# Patient Record
Sex: FEMALE | Race: WHITE | URBAN - METROPOLITAN AREA
[De-identification: names, ages, dates, MRNs, and addresses within clinical notes are randomized per-mention and may not be internally consistent; named-entity substitution may affect disease eponyms.]

---

## 2023-05-16 ENCOUNTER — OFFICE VISIT (OUTPATIENT)
Dept: FAMILY MEDICINE CLINIC | Facility: CLINIC | Age: 38
End: 2023-05-16

## 2023-05-16 VITALS
DIASTOLIC BLOOD PRESSURE: 65 MMHG | BODY MASS INDEX: 26.62 KG/M2 | TEMPERATURE: 98 F | WEIGHT: 175.63 LBS | HEART RATE: 68 BPM | SYSTOLIC BLOOD PRESSURE: 102 MMHG | HEIGHT: 68 IN

## 2023-05-16 DIAGNOSIS — O24.410 DIET CONTROLLED GESTATIONAL DIABETES MELLITUS (GDM), ANTEPARTUM: ICD-10-CM

## 2023-05-16 DIAGNOSIS — Z00.00 ADULT GENERAL MEDICAL EXAM: Primary | ICD-10-CM

## 2023-05-16 DIAGNOSIS — R53.83 LETHARGY: ICD-10-CM

## 2023-05-16 PROCEDURE — 99385 PREV VISIT NEW AGE 18-39: CPT | Performed by: FAMILY MEDICINE

## 2023-05-16 PROCEDURE — 3008F BODY MASS INDEX DOCD: CPT | Performed by: FAMILY MEDICINE

## 2023-05-16 PROCEDURE — 3078F DIAST BP <80 MM HG: CPT | Performed by: FAMILY MEDICINE

## 2023-05-16 PROCEDURE — 3074F SYST BP LT 130 MM HG: CPT | Performed by: FAMILY MEDICINE

## 2023-05-16 RX ORDER — FERROUS SULFATE 325(65) MG
TABLET ORAL
COMMUNITY
End: 2023-05-16

## 2023-05-16 RX ORDER — IBUPROFEN 600 MG/1
600 TABLET ORAL 4 TIMES DAILY PRN
COMMUNITY
Start: 2023-04-10

## 2023-05-17 ENCOUNTER — PATIENT MESSAGE (OUTPATIENT)
Dept: FAMILY MEDICINE CLINIC | Facility: CLINIC | Age: 38
End: 2023-05-17

## 2023-05-17 DIAGNOSIS — R53.83 LETHARGY: Primary | ICD-10-CM

## 2023-05-17 DIAGNOSIS — D64.9 ANEMIA, UNSPECIFIED TYPE: ICD-10-CM

## 2023-05-17 LAB
ABSOLUTE BASOPHILS: 11 CELLS/UL (ref 0–200)
ABSOLUTE EOSINOPHILS: 68 CELLS/UL (ref 15–500)
ABSOLUTE LYMPHOCYTES: 1098 CELLS/UL (ref 850–3900)
ABSOLUTE MONOCYTES: 308 CELLS/UL (ref 200–950)
ABSOLUTE NEUTROPHILS: 2314 CELLS/UL (ref 1500–7800)
ALBUMIN/GLOBULIN RATIO: 1.8 (CALC) (ref 1–2.5)
ALBUMIN: 4.4 G/DL (ref 3.6–5.1)
ALKALINE PHOSPHATASE: 58 U/L (ref 31–125)
ALT: 12 U/L (ref 6–29)
AST: 12 U/L (ref 10–30)
BASOPHILS: 0.3 %
BILIRUBIN, TOTAL: 0.5 MG/DL (ref 0.2–1.2)
BUN: 13 MG/DL (ref 7–25)
CALCIUM: 9 MG/DL (ref 8.6–10.2)
CARBON DIOXIDE: 24 MMOL/L (ref 20–32)
CHLORIDE: 105 MMOL/L (ref 98–110)
CHOL/HDLC RATIO: 1.9 (CALC)
CHOLESTEROL, TOTAL: 115 MG/DL
CREATININE: 0.64 MG/DL (ref 0.5–0.97)
EGFR: 117 ML/MIN/1.73M2
EOSINOPHILS: 1.8 %
GLOBULIN: 2.4 G/DL (CALC) (ref 1.9–3.7)
GLUCOSE: 87 MG/DL (ref 65–99)
HDL CHOLESTEROL: 62 MG/DL
HEMATOCRIT: 32.3 % (ref 35–45)
HEMOGLOBIN: 9.7 G/DL (ref 11.7–15.5)
LDL-CHOLESTEROL: 39 MG/DL (CALC)
LYMPHOCYTES: 28.9 %
MCH: 22.2 PG (ref 27–33)
MCHC: 30 G/DL (ref 32–36)
MCV: 73.9 FL (ref 80–100)
MONOCYTES: 8.1 %
MPV: 10.7 FL (ref 7.5–12.5)
NEUTROPHILS: 60.9 %
NON-HDL CHOLESTEROL: 53 MG/DL (CALC)
PLATELET COUNT: 161 THOUSAND/UL (ref 140–400)
POTASSIUM: 4.3 MMOL/L (ref 3.5–5.3)
PROTEIN, TOTAL: 6.8 G/DL (ref 6.1–8.1)
RDW: 15.4 % (ref 11–15)
RED BLOOD CELL COUNT: 4.37 MILLION/UL (ref 3.8–5.1)
SODIUM: 138 MMOL/L (ref 135–146)
TRIGLYCERIDES: 49 MG/DL
TSH W/REFLEX TO FT4: 1.55 MIU/L
VITAMIN D, 25-OH, TOTAL: 33 NG/ML (ref 30–100)
WHITE BLOOD CELL COUNT: 3.8 THOUSAND/UL (ref 3.8–10.8)

## 2023-05-18 NOTE — TELEPHONE ENCOUNTER
From: Shira Herrera  To: Nikole Elizalde DO  Sent: 5/17/2023 8:26 PM CDT  Subject: Question regarding LIPID PANEL    As a suspected. No, I would say that my periods are pretty normal.  Any recommendations regarding my anemia?

## 2023-05-24 ENCOUNTER — PATIENT MESSAGE (OUTPATIENT)
Dept: FAMILY MEDICINE CLINIC | Facility: CLINIC | Age: 38
End: 2023-05-24

## 2023-05-24 LAB
% SATURATION: 5 % (CALC) (ref 16–45)
FERRITIN: 3 NG/ML (ref 16–154)
IRON BINDING CAPACITY: 434 MCG/DL (CALC) (ref 250–450)
IRON, TOTAL: 21 MCG/DL (ref 40–190)
RETICULOCYTE COUNT,$AUTOMATED: 1.1 %
RETICULOCYTE, ABSOLUTE: NORMAL CELLS/UL (ref 20000–80000)
VITAMIN D, 25-OH, TOTAL: 31 NG/ML (ref 30–100)

## 2023-05-24 RX ORDER — FERROUS SULFATE 325(65) MG
325 TABLET ORAL
Qty: 90 TABLET | Refills: 1 | Status: SHIPPED | OUTPATIENT
Start: 2023-05-24 | End: 2023-11-20

## 2023-05-25 NOTE — TELEPHONE ENCOUNTER
From: Dianne Campa  To: Wilma Lesch, DO  Sent: 5/24/2023 2:10 PM CDT  Subject: Question regarding FERRITIN    Hi  Will I have to get prescription iron or something I should get over the counter?   Also to let you know my appointment with GI doctor is on July 11th

## 2023-07-11 ENCOUNTER — TELEPHONE (OUTPATIENT)
Facility: CLINIC | Age: 38
End: 2023-07-11

## 2023-07-11 ENCOUNTER — LAB ENCOUNTER (OUTPATIENT)
Dept: LAB | Facility: HOSPITAL | Age: 38
End: 2023-07-11
Attending: PHYSICIAN ASSISTANT
Payer: COMMERCIAL

## 2023-07-11 ENCOUNTER — OFFICE VISIT (OUTPATIENT)
Facility: CLINIC | Age: 38
End: 2023-07-11

## 2023-07-11 VITALS
HEART RATE: 71 BPM | SYSTOLIC BLOOD PRESSURE: 101 MMHG | DIASTOLIC BLOOD PRESSURE: 60 MMHG | WEIGHT: 180 LBS | BODY MASS INDEX: 27.28 KG/M2 | HEIGHT: 68 IN

## 2023-07-11 DIAGNOSIS — D50.9 IRON DEFICIENCY ANEMIA, UNSPECIFIED IRON DEFICIENCY ANEMIA TYPE: Primary | ICD-10-CM

## 2023-07-11 LAB
BASOPHILS # BLD AUTO: 0.01 X10(3) UL (ref 0–0.2)
BASOPHILS NFR BLD AUTO: 0.2 %
DEPRECATED HBV CORE AB SER IA-ACNC: 15.1 NG/ML
DEPRECATED RDW RBC AUTO: 56.8 FL (ref 35.1–46.3)
EOSINOPHIL # BLD AUTO: 0.08 X10(3) UL (ref 0–0.7)
EOSINOPHIL NFR BLD AUTO: 1.7 %
ERYTHROCYTE [DISTWIDTH] IN BLOOD BY AUTOMATED COUNT: 20 % (ref 11–15)
HCT VFR BLD AUTO: 35.7 %
HGB BLD-MCNC: 11.1 G/DL
IMM GRANULOCYTES # BLD AUTO: 0.01 X10(3) UL (ref 0–1)
IMM GRANULOCYTES NFR BLD: 0.2 %
LYMPHOCYTES # BLD AUTO: 1.25 X10(3) UL (ref 1–4)
LYMPHOCYTES NFR BLD AUTO: 26 %
MCH RBC QN AUTO: 24.4 PG (ref 26–34)
MCHC RBC AUTO-ENTMCNC: 31.1 G/DL (ref 31–37)
MCV RBC AUTO: 78.6 FL
MONOCYTES # BLD AUTO: 0.32 X10(3) UL (ref 0.1–1)
MONOCYTES NFR BLD AUTO: 6.7 %
NEUTROPHILS # BLD AUTO: 3.13 X10 (3) UL (ref 1.5–7.7)
NEUTROPHILS # BLD AUTO: 3.13 X10(3) UL (ref 1.5–7.7)
NEUTROPHILS NFR BLD AUTO: 65.2 %
PLATELET # BLD AUTO: 174 10(3)UL (ref 150–450)
RBC # BLD AUTO: 4.54 X10(6)UL
WBC # BLD AUTO: 4.8 X10(3) UL (ref 4–11)

## 2023-07-11 PROCEDURE — 3074F SYST BP LT 130 MM HG: CPT | Performed by: PHYSICIAN ASSISTANT

## 2023-07-11 PROCEDURE — 3008F BODY MASS INDEX DOCD: CPT | Performed by: PHYSICIAN ASSISTANT

## 2023-07-11 PROCEDURE — 99204 OFFICE O/P NEW MOD 45 MIN: CPT | Performed by: PHYSICIAN ASSISTANT

## 2023-07-11 PROCEDURE — 82728 ASSAY OF FERRITIN: CPT | Performed by: PHYSICIAN ASSISTANT

## 2023-07-11 PROCEDURE — 85025 COMPLETE CBC W/AUTO DIFF WBC: CPT | Performed by: PHYSICIAN ASSISTANT

## 2023-07-11 PROCEDURE — 36415 COLL VENOUS BLD VENIPUNCTURE: CPT | Performed by: PHYSICIAN ASSISTANT

## 2023-07-11 PROCEDURE — 3078F DIAST BP <80 MM HG: CPT | Performed by: PHYSICIAN ASSISTANT

## 2023-07-11 RX ORDER — SODIUM, POTASSIUM,MAG SULFATES 17.5-3.13G
SOLUTION, RECONSTITUTED, ORAL ORAL
Qty: 1 EACH | Refills: 0 | Status: SHIPPED | OUTPATIENT
Start: 2023-07-11

## 2023-07-11 NOTE — H&P
[facility] - Gastroenterology                                                                                                               Reason for consult: Patient presents with:  Consult: For anemia      Requesting physician or provider: Hien Menard MD      HPI:   Emigdio Way is a 40year old year-old female with history of anemia who presents for evaluation of anemia. She was seen for her annual physical. She notes she complained of mild fatigue. She got CBC done that showed a hemoglobin of 9.7 and a ferritin of 3. She denies dizziness, light headedness. Denies brbpr, melena. she denies acid reflux and/or heartburn. she denies dysphagia, odynophagia and/or globus. she denies abdominal pain. she denies nausea and/or vomiting. she denies recent change in appetite and/or unintentional weight loss. she denies bloating. Menses: monthly, heavy for first 2 days (use of super tampon every 2 hours), then light     NSAIDS/ASA: no  Tobacco: no  Alcohol: social   Marijuana: none  Illicit drugs: none    FH GI malignancyNo    No history of adverse reaction to sedation  No BELEN  No anticoagulants  No pacemaker/defibrillator  No pain medications and/or sleep aides      Last colonoscopy: none  Last EGD: none    Wt Readings from Last 6 Encounters:  23 : 180 lb (81.6 kg)  23 : 175 lb 9.6 oz (79.7 kg)       History, Medications, Allergies, ROS:      History reviewed. No pertinent past medical history.    Past Surgical History:   Procedure Laterality Date            Family Hx:   Family History   Problem Relation Age of Onset    Cancer Maternal Grandfather         lung      Social History:   Social History     Socioeconomic History    Marital status:    Tobacco Use    Smoking status: Never    Smokeless tobacco: Never   Substance and Sexual Activity    Alcohol use: Yes     Comment: socially        Medications (Active prior to today's visit):  Current Outpatient Medications   Medication Sig Dispense Refill    Na Sulfate-K Sulfate-Mg Sulf (SUPREP BOWEL PREP KIT) 17.5-3.13-1.6 GM/177ML Oral Solution Take as directed 1 each 0    Ferrous Sulfate 325 (65 Fe) MG Oral Tab Take 1 tablet (325 mg total) by mouth daily with breakfast. 90 tablet 1    ibuprofen 600 MG Oral Tab Take 1 tablet (600 mg total) by mouth 4 (four) times daily as needed. (Patient not taking: Reported on 7/11/2023)         Allergies:  No Known Allergies    ROS:   CONSTITUTIONAL: negative for fevers, chills, sweats and weight loss  EYES Negative for red eyes, yellow eyes, changes in vision  HEENT: Negative for dysphagia and hoarseness  RESPIRATORY: Negative for cough and shortness of breath  CARDIOVASCULAR: Negative for chest pain, palpitations  GASTROINTESTINAL: See HPI  GENITOURINARY: Negative for dysuria and frequency  MUSCULOSKELETAL: Negative for arthralgias and myalgias  NEUROLOGICAL: Negative for dizziness and headaches  BEHAVIOR/PSYCH: Negative for anxiety and poor appetite    PHYSICAL EXAM:   Blood pressure 101/60, pulse 71, height 5' 8\" (1.727 m), weight 180 lb (81.6 kg), last menstrual period 05/01/2023. GEN: WD/WN, NAD  HEENT: Supple symmetrical, trachea midline  CV: RRR, the extremities are warm and well perfused   LUNGS: No increased work of breathing  ABDOMEN: No scars, normal bowel sounds, soft, non-tender, non-distended no rebound or guarding, no masses, no hepatomegaly  MSK: No redness, no warmth, no swelling of joints  SKIN: No jaundice, no erythema, no rashes  HEMATOLOGIC: No bleeding, no bruising  NEURO: Alert and interactive, normal gait    Labs/Imaging/Procedures:     Patient's pertinent labs and imaging were reviewed and discussed with patient today.      Lab Results   Component Value Date    WBC 3.8 05/16/2023    RBC 4.37 05/16/2023    HGB 9.7 (L) 05/16/2023    HCT 32.3 (L) 05/16/2023    MCV 73.9 (L) 05/16/2023    MCH 22.2 (L) 05/16/2023    MCHC 30.0 (L) 05/16/2023    RDW 15.4 (H) 05/16/2023     05/16/2023        Lab Results   Component Value Date    GLU 87 05/16/2023    BUN 13 05/16/2023    BUNCREA NOT APPLICABLE 56/34/5828    CREATSERUM 0.64 05/16/2023    CA 9.0 05/16/2023    ALKPHO 58 05/16/2023    AST 12 05/16/2023    ALT 12 05/16/2023    BILT 0.5 05/16/2023    TP 6.8 05/16/2023    ALB 4.4 05/16/2023    GLOBULIN 2.4 05/16/2023    AGRATIO 1.8 05/16/2023     05/16/2023    K 4.3 05/16/2023     05/16/2023    CO2 24 05/16/2023        No results found. .  ASSESSMENT/PLAN:   Rose Chapa is a 40year old year-old female with history of anemia who presents for evaluation of anemia. #LEO  Patient presents for LEO. Discussed possible gynecologic issue vs GI concern. Patient has no overt signs of GIB at this time. Plan for upper and lower endoscopy at this time. Patient is agreeable to plan. Will also recheck lab work today and have her continue iron. 1. Schedule colonoscopy/EGD with Dr. Precious Rocha or Dr. José Miguel Moore with [facility]  [Diagnosis: LEO]    2.  bowel prep from pharmacy (split suprep)    3. Continue all medications as normal for your procedure. 4. Read all bowel prep instructions carefully. Bowel prep instructions can also be found online at:  www.[facility].org/giprep     5. AVOID seeds, nuts, popcorn, raw fruits and vegetables for 3 days before procedure    6. You MAY need to go for COVID testing 72 hours before procedure. The testing team will call you a few days before your procedure to discuss with you if testing is required. If you are asked to go for COVID testing and do not completed the test, the procedure cannot be performed. 7. If you start any NEW medication after your visit today, please notify us. Certain medications (like iron or weight loss medications) will need to be held before the procedure, or the procedure cannot be performed safely.       Orders This Visit:  Orders Placed This Encounter      CBC W Differential W Platelet      Ferritin      Meds This Visit:  Requested Prescriptions     Signed Prescriptions Disp Refills    Na Sulfate-K Sulfate-Mg Sulf (SUPREP BOWEL PREP KIT) 17.5-3.13-1.6 GM/177ML Oral Solution 1 each 0     Sig: Take as directed       Imaging & Referrals:  None      Karri Villalba PA-C   7/11/2023        This note was partially prepared using [employer] voice recognition dictation software. As a result, errors may occur. When identified, these errors have been corrected.  While every attempt is made to correct errors during dictation, discrepancies may still exist.

## 2023-07-11 NOTE — TELEPHONE ENCOUNTER
Scheduled for:  colonoscopy 47499/48[address], EGD [address]  Provider Name:  Dr. Zafar Anthony  Date:  10/10/23  Location:  [address] [place] [address] 1  Sedation:  Mac  Time:  2:00 pm (pt is aware to arrive at 1:00 pm)    Prep:  suprep  Meds/Allergies Reconciled?:  Physician reviewed      Diagnosis with codes:  Iron Deficiency anemia d50.9  Was patient informed to call insurance with codes (Y/N):  yes     Referral sent?:  Referral was sent at the time of electronic surgical scheduling. [facility] or [address] notified?:  I sent an electronic request to Endo Scheduling and received a confirmation today. Medication Orders:  Pt is aware to NOT take iron pills, herbal meds and diet supplements for 7 days before exam. Also to NOT take any form of alcohol, recreational drugs and any forms of ED meds 24 hours before exam.     Misc Orders:       Further instructions given by staff:  I discussed prep instructions with the patient at the time of the appointment which she verbally understood and given the prep instructions at the time of the appointment. Patient was informed about the new cancellation policy for his/her procedure. Patient was also given a copy of the cancellation policy at the time of the appointment and verbalized understanding.

## 2023-07-11 NOTE — PATIENT INSTRUCTIONS
1. Schedule colonoscopy/EGD with Dr. Jie Owens or Dr. Pino Rehman with [facility]  [Diagnosis: LEO]    2.  bowel prep from pharmacy ([facility] suprep)    3. Continue all medications as normal for your procedure. 4. Read all bowel prep instructions carefully. Bowel prep instructions can also be found online at:  www.health.org/giprep     5. AVOID seeds, nuts, popcorn, raw fruits and vegetables for 3 days before procedure    6. You MAY need to go for COVID testing 72 hours before procedure. The testing team will call you a few days before your procedure to discuss with you if testing is required. If you are asked to go for COVID testing and do not completed the test, the procedure cannot be performed. 7. If you start any NEW medication after your visit today, please notify us. Certain medications (like iron or weight loss medications) will need to be held before the procedure, or the procedure cannot be performed safely.

## 2023-10-07 ENCOUNTER — TELEPHONE (OUTPATIENT)
Facility: CLINIC | Age: 38
End: 2023-10-07

## 2023-10-07 NOTE — TELEPHONE ENCOUNTER
The patient contacted me as the on-call physician. She has a colonoscopy and EGD scheduled on Tuesday. She developed a recent upper respiratory infection with sore throat and congestion a few days prior. No fever or shortness of breath. She questions whether she can take OTC medications. She may take acetaminophen or OTC cold medications up until Monday. She tested COVID-negative at home. We will follow-up with the patient Monday morning to confirm that she is on the mend before she proceeds with the bowel preparation and procedures tomorrow. GI RNs: Please contact the patient Monday morning regarding her status and if she is improving she may proceed with the procedures as scheduled on Tuesday.   Arash Jules

## 2023-10-07 NOTE — TELEPHONE ENCOUNTER
Pt wants to get in contact with a nurse , she's been having a cough and wanted to know if she's able to take any medication. She has a colon this upcoming Tuesday.

## 2023-10-09 NOTE — TELEPHONE ENCOUNTER
Spoke with patient confirmed she feels well enough to drink her bowel prep as directed. Patient had no further questions at this time.

## 2023-10-09 NOTE — TELEPHONE ENCOUNTER
Spoke to patient and informed she may take over the counter medications to  help with cold symptoms. Verbalized understanding and states she was also able to speak with on call provider over the weekend. Confirmed no fevers at this time.

## 2023-10-09 NOTE — TELEPHONE ENCOUNTER
Dr Tran Ferrell (office on call)/Dr Mara Cooper    I called and spoke to the patient to get an update, /name verified. She stated, some of her symptoms have improved except for congestion. Coughing is better, no fever or sob over the weekend. She is scheduled for EGD/colonoscopy 10/10/2023 under [facility]. Please advise if ok to proceed.     Thank you

## 2023-10-10 ENCOUNTER — ANESTHESIA EVENT (OUTPATIENT)
Dept: ENDOSCOPY | Age: 38
End: 2023-10-10
Payer: COMMERCIAL

## 2023-10-10 ENCOUNTER — HOSPITAL ENCOUNTER (OUTPATIENT)
Age: 38
Setting detail: HOSPITAL OUTPATIENT SURGERY
Discharge: HOME | End: 2023-10-10
Attending: INTERNAL MEDICINE | Admitting: INTERNAL MEDICINE
Payer: COMMERCIAL

## 2023-10-10 ENCOUNTER — ANESTHESIA (OUTPATIENT)
Dept: ENDOSCOPY | Age: 38
End: 2023-10-10
Payer: COMMERCIAL

## 2023-10-10 VITALS
SYSTOLIC BLOOD PRESSURE: 116 MMHG | WEIGHT: 175 LBS | BODY MASS INDEX: 26.52 KG/M2 | OXYGEN SATURATION: 100 % | RESPIRATION RATE: 14 BRPM | HEART RATE: 57 BPM | TEMPERATURE: 99 F | DIASTOLIC BLOOD PRESSURE: 66 MMHG | HEIGHT: 68 IN

## 2023-10-10 DIAGNOSIS — D50.9 IRON DEFICIENCY ANEMIA, UNSPECIFIED IRON DEFICIENCY ANEMIA TYPE: ICD-10-CM

## 2023-10-10 LAB — B-HCG UR QL: NEGATIVE

## 2023-10-10 PROCEDURE — 88342 IMHCHEM/IMCYTCHM 1ST ANTB: CPT | Performed by: INTERNAL MEDICINE

## 2023-10-10 PROCEDURE — 43239 EGD BIOPSY SINGLE/MULTIPLE: CPT | Performed by: INTERNAL MEDICINE

## 2023-10-10 PROCEDURE — 45378 DIAGNOSTIC COLONOSCOPY: CPT | Performed by: INTERNAL MEDICINE

## 2023-10-10 PROCEDURE — 88312 SPECIAL STAINS GROUP 1: CPT | Performed by: INTERNAL MEDICINE

## 2023-10-10 PROCEDURE — 88305 TISSUE EXAM BY PATHOLOGIST: CPT | Performed by: INTERNAL MEDICINE

## 2023-10-10 PROCEDURE — 99070 SPECIAL SUPPLIES PHYS/QHP: CPT | Performed by: INTERNAL MEDICINE

## 2023-10-10 PROCEDURE — 81025 URINE PREGNANCY TEST: CPT

## 2023-10-10 RX ORDER — DEXTROSE MONOHYDRATE 25 G/50ML
50 INJECTION, SOLUTION INTRAVENOUS
OUTPATIENT
Start: 2023-10-10

## 2023-10-10 RX ORDER — NICOTINE POLACRILEX 4 MG
15 LOZENGE BUCCAL
OUTPATIENT
Start: 2023-10-10

## 2023-10-10 RX ORDER — NICOTINE POLACRILEX 4 MG
30 LOZENGE BUCCAL
OUTPATIENT
Start: 2023-10-10

## 2023-10-10 RX ORDER — LIDOCAINE HYDROCHLORIDE 10 MG/ML
INJECTION, SOLUTION EPIDURAL; INFILTRATION; INTRACAUDAL; PERINEURAL AS NEEDED
Status: DISCONTINUED | OUTPATIENT
Start: 2023-10-10 | End: 2023-10-10 | Stop reason: SURG

## 2023-10-10 RX ORDER — NALOXONE HYDROCHLORIDE 0.4 MG/ML
80 INJECTION, SOLUTION INTRAMUSCULAR; INTRAVENOUS; SUBCUTANEOUS AS NEEDED
OUTPATIENT
Start: 2023-10-10 | End: 2023-10-10

## 2023-10-10 RX ORDER — SODIUM CHLORIDE, SODIUM LACTATE, POTASSIUM CHLORIDE, CALCIUM CHLORIDE 600; 310; 30; 20 MG/100ML; MG/100ML; MG/100ML; MG/100ML
INJECTION, SOLUTION INTRAVENOUS CONTINUOUS
Status: DISCONTINUED | OUTPATIENT
Start: 2023-10-10 | End: 2023-10-10

## 2023-10-10 RX ORDER — SODIUM CHLORIDE, SODIUM LACTATE, POTASSIUM CHLORIDE, CALCIUM CHLORIDE 600; 310; 30; 20 MG/100ML; MG/100ML; MG/100ML; MG/100ML
INJECTION, SOLUTION INTRAVENOUS CONTINUOUS
OUTPATIENT
Start: 2023-10-10

## 2023-10-10 RX ADMIN — SODIUM CHLORIDE, SODIUM LACTATE, POTASSIUM CHLORIDE, CALCIUM CHLORIDE: 600; 310; 30; 20 INJECTION, SOLUTION INTRAVENOUS at 13:56:00

## 2023-10-10 RX ADMIN — LIDOCAINE HYDROCHLORIDE 50 MG: 10 INJECTION, SOLUTION EPIDURAL; INFILTRATION; INTRACAUDAL; PERINEURAL at 13:37:00

## 2023-10-10 RX ADMIN — SODIUM CHLORIDE, SODIUM LACTATE, POTASSIUM CHLORIDE, CALCIUM CHLORIDE: 600; 310; 30; 20 INJECTION, SOLUTION INTRAVENOUS at 13:36:00

## 2023-10-10 NOTE — OPERATIVE REPORT
[facility] Endoscopy Report  Date of procedure-October 10, 2023    Preoperative Diagnosis:  -Iron deficiency anemia      Postoperative Diagnosis:  -Small internal hemorrhoids  -Gastritis  -Irregular Z-line      Procedure:    Colonoscopy   Esophagogastroduodenoscopy       Surgeon:  Kely Abbott M.D. Anesthesia:  MAC    Technique:  After informed consent, the patient was placed in the left lateral recumbent position. Digital rectal examination revealed no palpable intraluminal abnormalities. An Olympus variable stiffness 190 series HD colonoscope was inserted into the rectum and advanced under direct vision by following the lumen to the cecum. The colon was examined upon withdrawal in the left lateral position. Following colonoscopy, an Olympus adult HD gastroscope was inserted into the hypopharynx and advanced under direct vision into the esophagus, stomach and duodenum. The endoscope was withdrawn to the stomach where retroflexion of the annulus, body, cardia and fundus was performed. The instrument was straightened, insufflated air and fluid were suctioned and the endoscope was withdrawn. The procedures were well tolerated without immediate complication. Findings:  The preparation of the colon was good. The terminal ileum was examined for 4 cm and visually normal.  The ileocecal valve was well preserved. The visualized colonic mucosa from the cecum to the anal verge was normal with an intact vascular pattern. Small internal hemorrhoids were noted on retroflexed view. The esophagus showed an irregular Z-line at the GE junction at 39 cm  The GE junction and diaphragmatic impression were at 39 cm. The stomach distended appropriately with insufflated air. The mucosa of the stomach showed subtle gastric erythema throughout the body and portions of the antrum. Biopsies taken.   The duodenal bulb and post bulbar regions were normal.    Estimated blood loss-insignificant  Specimens-see above    Impression:  -Small internal hemorrhoids  -Gastritis  -Irregular Z-line    Recommendations:  - Post procedure instructions given  - Repeat colonoscopy at screening age, 40  - Symptomatic treatment of hemorrhoids  - Follow up on upper GI biopsies  - Anemia likely related to menstrual blood loss        Dillon Mehta.  Anat Irizarry MD  10/10/2023  2:08 PM

## 2023-10-10 NOTE — DISCHARGE INSTRUCTIONS

## 2023-10-10 NOTE — ANESTHESIA POSTPROCEDURE EVALUATION
Patient: Demetrius Ch    Procedure Summary       Date: 10/10/23 Room / Location: [facility] / [address] [facility] ENDO    Anesthesia Start: 8684 Anesthesia Stop: 8577    Procedures:       COLONOSCOPY / ESOPHAGOGASTRODUODENOSCOPY      ESOPHAGOGASTRODUODENOSCOPY (EGD) Diagnosis:       Iron deficiency anemia, unspecified iron deficiency anemia type      (hemorrhoids, gastritis, irregular zline)    Surgeons: Florence Hicks MD Anesthesiologist: Khalif Salgado DO    Anesthesia Type: MAC ASA Status: 2            Anesthesia Type: MAC    Vitals Value Taken Time   /53 10/10/23 1410   Temp  10/10/23 1413   Pulse 91 10/10/23 1410   Resp 18 10/10/23 1410   SpO2 98 % 10/10/23 1410       [facility] AN Post Evaluation:   Patient Evaluated in PACU  Patient Participation: complete - patient participated  Level of Consciousness: awake  Pain Management: adequate  Airway Patency:patent  Dental exam unchanged from preop  Yes    Cardiovascular Status: acceptable  Respiratory Status: acceptable  Postoperative Hydration acceptable  Comments: Patient had hemodynamically stable pvc's when under propofol sedation and for a few minutes in recovery room. Now resolved. Will inform patient.       Jaky Piper DO  10/10/2023 2:13 PM

## 2023-10-10 NOTE — OR NURSING
Patient observed to have multiple PVC's during procedure and post procedure. Patient has no complaints of shortness of breath or chest pain. Patient advised by Dr. Mini Mabry to follow up with Primary Physician. Patient was noted to be in Sinus Rhythm pre op. Patient given copy of rhythm strip with PCVs to bring to Primary Physician.

## 2023-10-10 NOTE — H&P
History & Physical Examination    Patient Name: Dangelo Goodman  MRN: [id number]  CSN: [id number]  YOB: 1985    Diagnosis:   Anemia        Na Sulfate-K Sulfate-Mg Sulf (SUPREP BOWEL PREP KIT) 17.5-3.13-1.6 GM/177ML Oral Solution, Take as directed, Disp: 1 each, Rfl: 0  Ferrous Sulfate 325 (65 Fe) MG Oral Tab, Take 1 tablet (325 mg total) by mouth daily with breakfast., Disp: 90 tablet, Rfl: 1, 10/3/2023  ibuprofen 600 MG Oral Tab, Take 1 tablet (600 mg total) by mouth 4 (four) times daily as needed. (Patient not taking: Reported on 2023), Disp: , Rfl: , 10/7/2023      lactated ringers infusion, , Intravenous, Continuous        Allergies: No Known Allergies    History reviewed. No pertinent past medical history. Past Surgical History:   Procedure Laterality Date           Family History   Problem Relation Age of Onset    Cancer Maternal Grandfather         lung     Social History    Tobacco Use      Smoking status: Never      Smokeless tobacco: Never    Alcohol use: Yes      Comment: socially      SYSTEM Check if Review is Normal Check if Physical Exam is Normal If not normal, please explain:   HEENT [x ] [ x]    NECK & BACK [x ] [x ]    HEART [x ] [ x]    LUNGS [x ] [ x]    ABDOMEN [x ] [x ]    UROGENITAL [ ] [ ]    EXTREMITIES [x ] [x ]    OTHER        [ x ] I have discussed the risks and benefits and alternatives with the patient/family. They understand and agree to proceed with plan of care. [ x ] I have reviewed the History and Physical done within the last 30 days. Any changes noted above. Meghann Meehan.  Coty Reed MD  10/10/2023  1:25 PM

## 2023-10-17 ENCOUNTER — TELEPHONE (OUTPATIENT)
Dept: GASTROENTEROLOGY | Facility: CLINIC | Age: 38
End: 2023-10-17

## 2023-10-17 NOTE — TELEPHONE ENCOUNTER
----- Message from Russell Reynoso MD sent at 10/12/2023  5:37 PM CDT -----  I wanted to get back to you with your colonoscopy and EGD results. You had no colon polyps. I would advise a repeat colonoscopy in 10 years. You do have internal hemorrhoids. The upper endoscopy showed mild irritation of the stomach lining which we called gastritis, biopsies were taken and negative for H. pylori (no signs of infection). Gastritis is likely related to acid or foods or medicines ( limit or avoid ibuprofen like medications and aspirin as this can irritate the stomach). Samples taken from the junction of the esophagus and stomach were also negative for any concerning changes, no signs of Armando's. Would advise bland diet, avoid spicy foods, caffeine and okay to use antiacids as needed. As we discussed to follow your blood counts, further work-up could include capsule endoscopy if you continue to have issues with anemia and no other source is identified.

## 2023-10-17 NOTE — TELEPHONE ENCOUNTER
Seen by patient Chele Ordoñez on 10/16/2023  2:55 PM     Health maintenance updated. 10 year colonoscopy recall placed in patient outreach. Next due on 10/10/2033 per  Dr. Felix Trevino.

## 2023-10-31 ENCOUNTER — EKG ENCOUNTER (OUTPATIENT)
Dept: LAB | Age: 38
End: 2023-10-31
Attending: FAMILY MEDICINE
Payer: COMMERCIAL

## 2023-10-31 ENCOUNTER — OFFICE VISIT (OUTPATIENT)
Dept: FAMILY MEDICINE CLINIC | Facility: CLINIC | Age: 38
End: 2023-10-31

## 2023-10-31 VITALS
DIASTOLIC BLOOD PRESSURE: 73 MMHG | HEART RATE: 69 BPM | HEIGHT: 68 IN | SYSTOLIC BLOOD PRESSURE: 116 MMHG | TEMPERATURE: 98 F | BODY MASS INDEX: 28.23 KG/M2 | WEIGHT: 186.25 LBS

## 2023-10-31 DIAGNOSIS — K64.8 INTERNAL HEMORRHOIDS: ICD-10-CM

## 2023-10-31 DIAGNOSIS — D50.9 IRON DEFICIENCY ANEMIA, UNSPECIFIED IRON DEFICIENCY ANEMIA TYPE: Primary | ICD-10-CM

## 2023-10-31 DIAGNOSIS — I49.3 PVC (PREMATURE VENTRICULAR CONTRACTION): ICD-10-CM

## 2023-10-31 DIAGNOSIS — K29.70 GASTRITIS WITHOUT BLEEDING, UNSPECIFIED CHRONICITY, UNSPECIFIED GASTRITIS TYPE: ICD-10-CM

## 2023-10-31 LAB
ATRIAL RATE: 64 BPM
P AXIS: -11 DEGREES
P-R INTERVAL: 134 MS
Q-T INTERVAL: 390 MS
QRS DURATION: 82 MS
QTC CALCULATION (BEZET): 402 MS
R AXIS: 26 DEGREES
T AXIS: 38 DEGREES
VENTRICULAR RATE: 64 BPM

## 2023-10-31 PROCEDURE — 3074F SYST BP LT 130 MM HG: CPT | Performed by: FAMILY MEDICINE

## 2023-10-31 PROCEDURE — 93010 ELECTROCARDIOGRAM REPORT: CPT | Performed by: INTERNAL MEDICINE

## 2023-10-31 PROCEDURE — 99214 OFFICE O/P EST MOD 30 MIN: CPT | Performed by: FAMILY MEDICINE

## 2023-10-31 PROCEDURE — 3078F DIAST BP <80 MM HG: CPT | Performed by: FAMILY MEDICINE

## 2023-10-31 PROCEDURE — 3008F BODY MASS INDEX DOCD: CPT | Performed by: FAMILY MEDICINE

## 2023-10-31 PROCEDURE — 93005 ELECTROCARDIOGRAM TRACING: CPT

## 2023-11-03 LAB
ABSOLUTE BASOPHILS: 18 CELLS/UL (ref 0–200)
ABSOLUTE EOSINOPHILS: 183 CELLS/UL (ref 15–500)
ABSOLUTE LYMPHOCYTES: 1581 CELLS/UL (ref 850–3900)
ABSOLUTE MONOCYTES: 466 CELLS/UL (ref 200–950)
ABSOLUTE NEUTROPHILS: 3652 CELLS/UL (ref 1500–7800)
BASOPHILS: 0.3 %
BUN: 13 MG/DL (ref 7–25)
CALCIUM: 9.2 MG/DL (ref 8.6–10.2)
CARBON DIOXIDE: 26 MMOL/L (ref 20–32)
CHLORIDE: 104 MMOL/L (ref 98–110)
CREATININE: 0.66 MG/DL (ref 0.5–0.97)
EGFR: 117 ML/MIN/1.73M2
EOSINOPHILS: 3.1 %
GLUCOSE: 70 MG/DL (ref 65–99)
HEMATOCRIT: 36 % (ref 35–45)
HEMOGLOBIN: 11.8 G/DL (ref 11.7–15.5)
LYMPHOCYTES: 26.8 %
MCH: 27.1 PG (ref 27–33)
MCHC: 32.8 G/DL (ref 32–36)
MCV: 82.6 FL (ref 80–100)
MONOCYTES: 7.9 %
MPV: 11.4 FL (ref 7.5–12.5)
NEUTROPHILS: 61.9 %
PLATELET COUNT: 184 THOUSAND/UL (ref 140–400)
POTASSIUM: 3.8 MMOL/L (ref 3.5–5.3)
RDW: 14 % (ref 11–15)
RED BLOOD CELL COUNT: 4.36 MILLION/UL (ref 3.8–5.1)
SODIUM: 138 MMOL/L (ref 135–146)
WHITE BLOOD CELL COUNT: 5.9 THOUSAND/UL (ref 3.8–10.8)

## 2024-08-27 ENCOUNTER — OFFICE VISIT (OUTPATIENT)
Dept: FAMILY MEDICINE CLINIC | Facility: CLINIC | Age: 39
End: 2024-08-27
Payer: COMMERCIAL

## 2024-08-27 ENCOUNTER — TELEPHONE (OUTPATIENT)
Dept: FAMILY MEDICINE CLINIC | Facility: CLINIC | Age: 39
End: 2024-08-27

## 2024-08-27 VITALS
SYSTOLIC BLOOD PRESSURE: 109 MMHG | DIASTOLIC BLOOD PRESSURE: 71 MMHG | TEMPERATURE: 97 F | WEIGHT: 199 LBS | HEART RATE: 64 BPM | BODY MASS INDEX: 30.16 KG/M2 | HEIGHT: 68 IN

## 2024-08-27 DIAGNOSIS — Z00.00 ADULT GENERAL MEDICAL EXAM: Primary | ICD-10-CM

## 2024-08-27 DIAGNOSIS — Z12.4 CERVICAL CANCER SCREENING: ICD-10-CM

## 2024-08-27 DIAGNOSIS — E55.9 VITAMIN D DEFICIENCY: ICD-10-CM

## 2024-08-27 DIAGNOSIS — D50.8 OTHER IRON DEFICIENCY ANEMIA: ICD-10-CM

## 2024-08-27 PROCEDURE — 3008F BODY MASS INDEX DOCD: CPT | Performed by: FAMILY MEDICINE

## 2024-08-27 PROCEDURE — 3074F SYST BP LT 130 MM HG: CPT | Performed by: FAMILY MEDICINE

## 2024-08-27 PROCEDURE — 99395 PREV VISIT EST AGE 18-39: CPT | Performed by: FAMILY MEDICINE

## 2024-08-27 PROCEDURE — 3078F DIAST BP <80 MM HG: CPT | Performed by: FAMILY MEDICINE

## 2024-08-27 NOTE — PROGRESS NOTES
Patient ID: Sabine Lagunas is a 38 year old female.    HPI  Chief Complaint   Patient presents with    Routine Physical     She had some anemia and had her EGD and colonoscopy in October of last year.  This was due to iron deficiency anemia.  Postoperative diagnosis was small internal hemorrhoids and gastritis.  They felt her anemia was most likely due to menstrual blood loss per Dr. Sevilla.    She states she has been off of iron for at least 6 months.  She states her menses are not very heavy and last perhaps 5 days.  She needs to find a gynecologist and told her we have a few at this office and gave her a referral.  She does have 3 children and works.  She does not smoke.    Otherwise she states she feels well.  Really no complaints.    Health Maintenance   Topic Date Due    Pap Smear  Never done    COVID-19 Vaccine (1 - 2023-24 season) Never done    Annual Depression Screening  01/01/2024    DTaP,Tdap,and Td Vaccines (2 - Td or Tdap) 04/13/2024    Annual Physical  05/16/2024    Influenza Vaccine (1) 10/01/2024    Colorectal Cancer Screening  10/10/2033    Pneumococcal Vaccine: Birth to 64yrs  Aged Out       =======================================================    Lab Results   Component Value Date    WBC 5.9 11/02/2023    RBC 4.36 11/02/2023    HGB 11.8 11/02/2023    HCT 36.0 11/02/2023     11/02/2023    MCV 82.6 11/02/2023    MCH 27.1 11/02/2023    MCHC 32.8 11/02/2023    RDW 14.0 11/02/2023    NEPRELIM 3.13 07/11/2023    NEPERCENT 61.9 11/02/2023    LYPERCENT 26.8 11/02/2023    MOPERCENT 7.9 11/02/2023    EOPERCENT 3.1 11/02/2023    BAPERCENT 0.3 11/02/2023    NE 3,652 11/02/2023    LYMABS 1,581 11/02/2023    MOABSO 466 11/02/2023    EOABSO 183 11/02/2023    BAABSO 18 11/02/2023       Lab Results   Component Value Date    GLU 70 11/02/2023    BUN 13 11/02/2023    BUNCREA SEE NOTE: 11/02/2023    CREATSERUM 0.66 11/02/2023    CA 9.2 11/02/2023    ALKPHO 58 05/16/2023    AST 12 05/16/2023    ALT 12  05/16/2023    BILT 0.5 05/16/2023    TP 6.8 05/16/2023    ALB 4.4 05/16/2023    GLOBULIN 2.4 05/16/2023    AGRATIO 1.8 05/16/2023     11/02/2023    K 3.8 11/02/2023     11/02/2023    CO2 26 11/02/2023       Lab Results   Component Value Date    GLU 70 11/02/2023    BUN 13 11/02/2023    CREATSERUM 0.66 11/02/2023    BUNCREA SEE NOTE: 11/02/2023    CA 9.2 11/02/2023     11/02/2023    K 3.8 11/02/2023     11/02/2023    CO2 26 11/02/2023       No results found for: \"COLORUR\", \"CLARITY\", \"SPECGRAVITY\", \"GLUUR\", \"BILUR\", \"KETUR\", \"BLOODURINE\", \"PHURINE\", \"PROUR\", \"UROBILINOGEN\", \"NITRITE\", \"LEUUR\", \"ASCACIDURINE\", \"NMIC\", \"WBCUR\", \"RBCUR\", \"EPIUR\", \"HYALCASTURN\", \"BACUR\", \"CAOXUR\", \"HYLUR\", \"MICCOM\"  No results found for: \"EAG\", \"A1C\"    No results found for: \"MALBP\", \"CREUR\", \"CREAURINE\", \"MIALBURINE\", \"MCRRATIOUR\", \"MALBCRECALC\", \"MICROALBUMIN\", \"CREAUR\", \"MALBCREACALC\"    Lab Results   Component Value Date    CHOLEST 115 05/16/2023    TRIG 49 05/16/2023    HDL 62 05/16/2023    LDL 39 05/16/2023    TCHDLRATIO 1.9 05/16/2023    NONHDLC 53 05/16/2023     TSH W/REFLEX TO FT4 (mIU/L)   Date Value   05/16/2023 1.55       No results found for: \"B12\", \"VITB12\"    Lab Results   Component Value Date    IRONTOT 21 (L) 05/23/2023       No results found for: \"SAT\"    Lab Results   Component Value Date    IRONTOT 21 (L) 05/23/2023    IRONBIND 434 05/23/2023    SATUR 5 (L) 05/23/2023       Lab Results   Component Value Date    HILLARY 15.1 07/11/2023       Lab Results   Component Value Date    VITD 31 05/23/2023     No results found for: \"PSA\", \"QPSA\", \"TOTPSASCREEN\"    =======================================================    Wt Readings from Last 6 Encounters:   08/27/24 199 lb (90.3 kg)   10/31/23 186 lb 4 oz (84.5 kg)   10/10/23 175 lb (79.4 kg)   07/11/23 180 lb (81.6 kg)   05/16/23 175 lb 9.6 oz (79.7 kg)               BMI Readings from Last 6 Encounters:   08/27/24 30.26 kg/m²   10/31/23 28.32 kg/m²    10/10/23 26.61 kg/m²   23 27.37 kg/m²   23 26.70 kg/m²       BP Readings from Last 6 Encounters:   24 109/71   10/31/23 116/73   10/10/23 116/66   23 101/60   23 102/65         Review of Systems  Patient denies any chest pain, shortness breath.      History reviewed. No pertinent past medical history.    Past Surgical History:   Procedure Laterality Date          Colonoscopy N/A 10/10/2023    Procedure: COLONOSCOPY;  Surgeon: Hemant Sevilla MD;  Location: [facility]       Social History     Socioeconomic History    Marital status:      Spouse name: Not on file    Number of children: Not on file    Years of education: Not on file    Highest education level: Not on file   Occupational History    Not on file   Tobacco Use    Smoking status: Never    Smokeless tobacco: Never   Vaping Use    Vaping status: Not on file   Substance and Sexual Activity    Alcohol use: Yes     Comment: socially    Drug use: Never    Sexual activity: Not on file   Other Topics Concern    Not on file   Social History Narrative    Not on file     Social Determinants of Health     Financial Resource Strain: Not on file   Food Insecurity: Not on file   Transportation Needs: Not on file   Physical Activity: Not on file   Stress: Not on file   Social Connections: Not on file   Housing Stability: Not on file          No current outpatient medications on file.     Allergies:No Known Allergies   PHYSICAL EXAM:   Physical Exam  Physical Exam   Constitutional: . She appears well-developed and well-nourished. No distress.   HENT:   Head: Normocephalic.   Right Ear: Tympanic membrane and ear canal normal.   Left Ear: Tympanic membrane and ear canal normal.   Mouth/Throat: Oropharynx is clear and moist and mucous membranes are normal.   Eyes: Conjunctivae and EOM are normal. Pupils are equal, round, and reactive to light.   Neck: Normal range of motion. Neck supple. No thyromegaly present.   Cardiovascular:  Normal rate, regular rhythm and no murmur heard.  Pulmonary/Chest: Effort normal and breath sounds normal. No respiratory distress.   Abdominal: Soft. Bowel sounds are normal. There is no hepatosplenomegaly. There is no tenderness.   Lymphadenopathy:     She has no  cervical adenopathy.   Neurological: She is alert and oriented to person, place, and time . She has normal reflexes. No cranial nerve deficit.   Skin: Skin is warm and dry. No rash noted.   Psychiatric: She has a normal mood and affect   No lower extremity edema.  2+ pedal pulses.  Vitals reviewed.    Blood pressure 109/71, pulse 64, temperature 97 °F (36.1 °C), temperature source Temporal, height 5' 8\" (1.727 m), weight 199 lb (90.3 kg), last menstrual period 10/18/2023.         ASSESSMENT/PLAN:     Diagnoses and all orders for this visit:    Adult general medical exam  -     CBC With Differential With Platelet  -     Comp Metabolic Panel (14)  -     Lipid Panel  -     Assay, Thyroid Stim Hormone  Patient Instructions   If you change your mind on the Tdap shot which is for tetanus go ahead and make a nurse visit.  It is once every 10 years.  She wanted to hold off on the Tdap injection but I did tell her how important it is.  Cervical cancer screening  -     OBG - INTERNAL    Other iron deficiency anemia  -     Ferritin  -     Iron And Tibc  History of anemia and has been off of iron so we will recheck.  Vitamin D deficiency  -     VITAMIN D, SCREEN [69931][Q]  She is not currently taking any vitamin D.      Referrals (if applicable)  Orders Placed This Encounter   Procedures    OBG - INTERNAL     Can see Dr Willson, Dr Harris, or Dr Gamez.   DR WILLSON IS AT THE [place] LOCATION ALSO.     Referral Priority:   Routine     Referral Type:   OFFICE VISIT     Referred to Provider:   Sumeet Willson MD     Requested Specialty:   OBSTETRICS & GYNECOLOGY     Number of Visits Requested:   3         Follow up if symptoms persist.  Take medicine (if given) as  prescribed.  Approach to treatment discussed and patient/family member understands and agrees to plan.     No follow-ups on file.      Nimesh Boyce DO  8/27/2024     no

## 2024-08-27 NOTE — PATIENT INSTRUCTIONS
If you change your mind on the Tdap shot which is for tetanus go ahead and make a nurse visit.  It is once every 10 years.

## 2024-08-30 LAB
% SATURATION: 5 % (CALC) (ref 16–45)
ABSOLUTE BASOPHILS: 21 CELLS/UL (ref 0–200)
ABSOLUTE EOSINOPHILS: 90 CELLS/UL (ref 15–500)
ABSOLUTE LYMPHOCYTES: 1140 CELLS/UL (ref 850–3900)
ABSOLUTE MONOCYTES: 349 CELLS/UL (ref 200–950)
ABSOLUTE NEUTROPHILS: 2501 CELLS/UL (ref 1500–7800)
ALBUMIN/GLOBULIN RATIO: 1.9 (CALC) (ref 1–2.5)
ALBUMIN: 4.5 G/DL (ref 3.6–5.1)
ALKALINE PHOSPHATASE: 67 U/L (ref 31–125)
ALT: 15 U/L (ref 6–29)
AST: 14 U/L (ref 10–30)
BASOPHILS: 0.5 %
BILIRUBIN, TOTAL: 0.4 MG/DL (ref 0.2–1.2)
BUN: 14 MG/DL (ref 7–25)
CALCIUM: 9.1 MG/DL (ref 8.6–10.2)
CARBON DIOXIDE: 25 MMOL/L (ref 20–32)
CHLORIDE: 106 MMOL/L (ref 98–110)
CHOL/HDLC RATIO: 2.5 (CALC)
CHOLESTEROL, TOTAL: 130 MG/DL
CREATININE: 0.75 MG/DL (ref 0.5–0.97)
EGFR: 105 ML/MIN/1.73M2
EOSINOPHILS: 2.2 %
FERRITIN: 2 NG/ML (ref 16–154)
GLOBULIN: 2.4 G/DL (CALC) (ref 1.9–3.7)
GLUCOSE: 100 MG/DL (ref 65–99)
HDL CHOLESTEROL: 53 MG/DL
HEMATOCRIT: 33.4 % (ref 35–45)
HEMOGLOBIN: 10.2 G/DL (ref 11.7–15.5)
IRON BINDING CAPACITY: 430 MCG/DL (CALC) (ref 250–450)
IRON, TOTAL: 23 MCG/DL (ref 40–190)
LDL-CHOLESTEROL: 59 MG/DL (CALC)
LYMPHOCYTES: 27.8 %
MCH: 23.9 PG (ref 27–33)
MCHC: 30.5 G/DL (ref 32–36)
MCV: 78.4 FL (ref 80–100)
MONOCYTES: 8.5 %
MPV: 11 FL (ref 7.5–12.5)
NEUTROPHILS: 61 %
NON-HDL CHOLESTEROL: 77 MG/DL (CALC)
PLATELET COUNT: 191 THOUSAND/UL (ref 140–400)
POTASSIUM: 4.4 MMOL/L (ref 3.5–5.3)
PROTEIN, TOTAL: 6.9 G/DL (ref 6.1–8.1)
RDW: 14.7 % (ref 11–15)
RED BLOOD CELL COUNT: 4.26 MILLION/UL (ref 3.8–5.1)
SODIUM: 139 MMOL/L (ref 135–146)
TRIGLYCERIDES: 96 MG/DL
TSH: 2 MIU/L
VITAMIN D, 25-OH, TOTAL: 35 NG/ML (ref 30–100)
WHITE BLOOD CELL COUNT: 4.1 THOUSAND/UL (ref 3.8–10.8)

## 2024-08-31 DIAGNOSIS — D50.9 IRON DEFICIENCY ANEMIA, UNSPECIFIED IRON DEFICIENCY ANEMIA TYPE: Primary | ICD-10-CM

## 2024-08-31 RX ORDER — FERROUS SULFATE 325(65) MG
325 TABLET ORAL
Qty: 90 TABLET | Refills: 1 | Status: SHIPPED | OUTPATIENT
Start: 2024-08-31 | End: 2025-02-27

## 2024-11-25 ENCOUNTER — OFFICE VISIT (OUTPATIENT)
Dept: FAMILY MEDICINE CLINIC | Facility: CLINIC | Age: 39
End: 2024-11-25
Payer: COMMERCIAL

## 2024-11-25 VITALS
HEIGHT: 68 IN | DIASTOLIC BLOOD PRESSURE: 68 MMHG | WEIGHT: 199 LBS | SYSTOLIC BLOOD PRESSURE: 104 MMHG | HEART RATE: 83 BPM | BODY MASS INDEX: 30.16 KG/M2

## 2024-11-25 DIAGNOSIS — J02.9 SORE THROAT: Primary | ICD-10-CM

## 2024-11-25 LAB
CONTROL LINE PRESENT WITH A CLEAR BACKGROUND (YES/NO): YES YES/NO
KIT LOT #: NORMAL NUMERIC
STREP GRP A CUL-SCR: NEGATIVE

## 2024-11-25 PROCEDURE — 87880 STREP A ASSAY W/OPTIC: CPT | Performed by: FAMILY MEDICINE

## 2024-11-25 PROCEDURE — 99213 OFFICE O/P EST LOW 20 MIN: CPT | Performed by: FAMILY MEDICINE

## 2024-11-25 PROCEDURE — 3074F SYST BP LT 130 MM HG: CPT | Performed by: FAMILY MEDICINE

## 2024-11-25 PROCEDURE — 3008F BODY MASS INDEX DOCD: CPT | Performed by: FAMILY MEDICINE

## 2024-11-25 PROCEDURE — 3078F DIAST BP <80 MM HG: CPT | Performed by: FAMILY MEDICINE

## 2024-11-25 RX ORDER — PENICILLIN V POTASSIUM 500 MG/1
500 TABLET, FILM COATED ORAL 3 TIMES DAILY
Qty: 30 TABLET | Refills: 0 | Status: SHIPPED | OUTPATIENT
Start: 2024-11-25

## 2024-11-26 NOTE — PROGRESS NOTES
Blood pressure 104/68, pulse 83, height 5' 8\" (1.727 m), weight 199 lb (90.3 kg).  Patient presents today following up for sore throat that began 2 days ago.  Worse in the morning.  Also with a swollen gland and some right ear pain.    Objective tender anterior cervical node noted on the right    Ears with TMs intact no redness    Throat erythematous no exudate    Rapid strep negative    Assessment pharyngitis    Plan Pen-Vee K follow-up if no improvement Advil for pain

## (undated) DEVICE — MEDI-VAC NON-CONDUCTIVE SUCTION TUBING: Brand: CARDINAL HEALTH

## (undated) DEVICE — Device: Brand: DUAL NARE NASAL CANNULAE FEMALE LUER CON 7FT O2 TUBE

## (undated) DEVICE — CONMED SCOPE SAVER BITE BLOCK, 20X27 MM: Brand: SCOPE SAVER

## (undated) DEVICE — KIT CLEAN ENDOKIT 1.1OZ GOWNX2

## (undated) DEVICE — FORCEPS BX L240CM DIA2.4MM L NDL RAD JAW 4

## (undated) DEVICE — MEDI-VAC NON-CONDUCTIVE SUCTION TUBING 6MM X 1.8M (6FT.) L: Brand: CARDINAL HEALTH

## (undated) DEVICE — 60 ML SYRINGE REGULAR TIP: Brand: MONOJECT

## (undated) DEVICE — KIT ENDO ORCAPOD 160/180/190

## (undated) DEVICE — YANKAUER SUCTION INSTRUMENT NO CONTROL VENT, BULB TIP, CLEAR: Brand: YANKAUER

## (undated) NOTE — LETTER
[address] [facility] [address] [facility], [place]  Authorization for Surgical Operation and Procedure                                                                                           I hereby authorize Malika Ramirez MD, my physician and his/her assistants (if applicable), which may include medical students, residents, and/or fellows, to perform the following surgical operation/ procedure and administer such anesthesia as may be determined necessary by my physician: Operation/Procedure name (s) COLONOSCOPY / ESOPHAGOGASTRODUODENOSCOPY on [address]   2. I recognize that during the surgical operation/procedure, unforeseen conditions may necessitate additional or different procedures than those listed above. I, therefore, further authorize and request that the above-named surgeon, assistants, or designees perform such procedures as are, in their judgment, necessary and desirable. 3.   My surgeon/physician has discussed prior to my surgery the potential benefits, risks and side effects of this procedure; the likelihood of achieving goals; and potential problems that might occur during recuperation. They also discussed reasonable alternatives to the procedure, including risks, benefits, and side effects related to the alternatives and risks related to not receiving this procedure. I have had all my questions answered and I acknowledge that no guarantee has been made as to the result that may be obtained. 4.   Should the need arise during my operation/procedure, which includes change of level of care prior to discharge, I also consent to the administration of blood and/or blood products. Further, I understand that despite careful testing and screening of blood or blood products by collecting agencies, I may still be subject to ill effects as a result of receiving a blood transfusion and/or blood products.   The following are some, but not all, of the potential risks that can occur: fever and allergic reactions, hemolytic reactions, transmission of diseases such as Hepatitis, AIDS and Cytomegalovirus (CMV) and fluid overload. In the event that I wish to have an autologous transfusion of my own blood, or a directed donor transfusion, I will discuss this with my physician. Check only if Refusing Blood or Blood Products  I understand refusal of blood or blood products as deemed necessary by my physician may have serious consequences to my condition to include possible death. I hereby assume responsibility for my refusal and release the hospital, its personnel, and my physicians from any responsibility for the consequences of my refusal.    o  Refuse   5. I authorize the use of any specimen, organs, tissues, body parts or foreign objects that may be removed from my body during the operation/procedure for diagnosis, research or teaching purposes and their subsequent disposal by hospital authorities. I also authorize the release of specimen test results and/or written reports to my treating physician on the hospital medical staff or other referring or consulting physicians involved in my care, at the discretion of the Pathologist or my treating physician. 6.   I consent to the photographing or videotaping of the operations or procedures to be performed, including appropriate portions of my body for medical, scientific, or educational purposes, provided my identity is not revealed by the pictures or by descriptive texts accompanying them. If the procedure has been photographed/videotaped, the surgeon will obtain the original picture, image, videotape or CD. The hospital will not be responsible for storage, release or maintenance of the picture, image, tape or CD.    7.   I consent to the presence of a  or observers in the operating room as deemed necessary by my physician or their designees.     8.   I recognize that in the event my procedure results in extended X-Ray/fluoroscopy time, I may develop a skin reaction. 9. If I have a Do Not Attempt Resuscitation (DNAR) order in place, that status will be suspended while in the operating room, procedural suite, and during the recovery period unless otherwise explicitly stated by me (or a person authorized to consent on my behalf). The surgeon or my attending physician will determine when the applicable recovery period ends for purposes of reinstating the DNAR order. 10. Patients having a sterilization procedure: I understand that if the procedure is successful the results will be permanent and it will therefore be impossible for me to inseminate, conceive, or bear children. I also understand that the procedure is intended to result in sterility, although the result has not been guaranteed. 11. I acknowledge that my physician has explained sedation/analgesia administration to me including the risk and benefits I consent to the administration of sedation/analgesia as may be necessary or desirable in the judgment of my physician. I CERTIFY THAT I HAVE READ AND FULLY UNDERSTAND THE ABOVE CONSENT TO OPERATION and/or OTHER PROCEDURE.     _________________________________________ _________________________________     ___________________________________  Signature of Patient     Signature of Responsible Person                   Printed Name of Responsible Person                              _________________________________________ ______________________________        ___________________________________  Signature of Witness         Date  Time         Relationship to Patient    STATEMENT OF PHYSICIAN My signature below affirms that prior to the time of the procedure; I have explained to the patient and/or his/her legal representative, the risks and benefits involved in the proposed treatment and any reasonable alternative to the proposed treatment.  I have also explained the risks and benefits involved in refusal of the proposed treatment and alternatives to the proposed treatment and have answered the patient's questions.  If I have a significant financial interest in a co-management agreement or a significant financial interest in any product or implant, or other significant relationship used in this procedure/surgery, I have disclosed this and had a discussion with my patient.     _______________________________________________________________ _____________________________  Malachi Rogers Physician)                                                                                         (Date)                                   (Time)  Patient Name: Fco Durham    : 1985   Printed: 10/9/2023      Medical Record #: [id number]                                              Page 1 of 1